# Patient Record
(demographics unavailable — no encounter records)

---

## 2025-02-07 NOTE — HISTORY OF PRESENT ILLNESS
[FreeTextEntry1] : NEW PATIENT Age of Onset of symptoms:  PMH: PSH: Meds: Allergies:  Birth Hx: PCP/Specialists:  Family hx of asthma: Family hx of cystic fibrosis, autoimmune disease, recurrent respiratory infections: Feeding issues, ULISES: DIANA sx: Hx of Eczema: Hx of rhinitis, postnasal drip: Hx of recurrent infections (ie: pneumonia, AOM, sinusitis): Seen by pulmonologist before:   Cough Hx Triggers: Hx of wheezing: Use of oral steroids: ED/Hospitalizations: Daytime cough: Nighttime cough: Respiratory symptoms with exercise: Participates in Chest x-ray: Vaccines UTD: Influenza vaccine: COVID 19 vaccine: H/o COVID19/RSV infection:     Modified Asthma Predictive Index (mAPI): 4 wheezing illnesses AND 1 major criteria Parental asthma: atopic dermatitis: Aeroallergen sensitization suspected:   OR   2 minor criteria Food sensitization: Peripheral blood eosinophilia =4%: Wheezing apart from colds: